# Patient Record
Sex: MALE | Race: WHITE | HISPANIC OR LATINO | Employment: FULL TIME | URBAN - METROPOLITAN AREA
[De-identification: names, ages, dates, MRNs, and addresses within clinical notes are randomized per-mention and may not be internally consistent; named-entity substitution may affect disease eponyms.]

---

## 2021-09-23 ENCOUNTER — OFFICE VISIT (OUTPATIENT)
Dept: MEDICAL GROUP | Facility: PHYSICIAN GROUP | Age: 36
End: 2021-09-23
Payer: COMMERCIAL

## 2021-09-23 VITALS
RESPIRATION RATE: 20 BRPM | HEART RATE: 87 BPM | DIASTOLIC BLOOD PRESSURE: 88 MMHG | SYSTOLIC BLOOD PRESSURE: 132 MMHG | TEMPERATURE: 99.3 F | OXYGEN SATURATION: 97 % | BODY MASS INDEX: 33.68 KG/M2 | WEIGHT: 209.6 LBS | HEIGHT: 66 IN

## 2021-09-23 DIAGNOSIS — Z23 NEED FOR VACCINATION: ICD-10-CM

## 2021-09-23 DIAGNOSIS — R53.83 OTHER FATIGUE: ICD-10-CM

## 2021-09-23 DIAGNOSIS — Z00.00 HEALTHCARE MAINTENANCE: ICD-10-CM

## 2021-09-23 DIAGNOSIS — Z76.89 ENCOUNTER TO ESTABLISH CARE: ICD-10-CM

## 2021-09-23 DIAGNOSIS — R63.5 WEIGHT GAIN: ICD-10-CM

## 2021-09-23 DIAGNOSIS — J30.2 SEASONAL ALLERGIES: ICD-10-CM

## 2021-09-23 PROCEDURE — 99204 OFFICE O/P NEW MOD 45 MIN: CPT | Mod: 25 | Performed by: STUDENT IN AN ORGANIZED HEALTH CARE EDUCATION/TRAINING PROGRAM

## 2021-09-23 PROCEDURE — 90471 IMMUNIZATION ADMIN: CPT | Performed by: STUDENT IN AN ORGANIZED HEALTH CARE EDUCATION/TRAINING PROGRAM

## 2021-09-23 PROCEDURE — 90686 IIV4 VACC NO PRSV 0.5 ML IM: CPT | Performed by: STUDENT IN AN ORGANIZED HEALTH CARE EDUCATION/TRAINING PROGRAM

## 2021-09-23 RX ORDER — FLUTICASONE PROPIONATE 50 MCG
1 SPRAY, SUSPENSION (ML) NASAL DAILY
Qty: 15.8 ML | Refills: 2 | Status: SHIPPED | OUTPATIENT
Start: 2021-09-23

## 2021-09-23 ASSESSMENT — PATIENT HEALTH QUESTIONNAIRE - PHQ9: CLINICAL INTERPRETATION OF PHQ2 SCORE: 0

## 2021-09-23 NOTE — PROGRESS NOTES
Subjective:     CC:  Diagnoses of Encounter to establish care, Seasonal allergies, Other fatigue, Weight gain, and Healthcare maintenance were pertinent to this visit.    HISTORY OF THE PRESENT ILLNESS: Patient is a 36 y.o. male. This pleasant patient is here today to establish care and discuss multiple concerns.    1.  Seasonal allergies  Caio has had seasonal allergies for most of his life.  This is his first opportunity to have this treated.  Manifest mostly as nasal congestion with occasional cough.  He does notice some symptoms throughout the year although certainly this is worse during spring and summer.  He has not attempted any medical treatments.    2.  Fatigue and weight gain  Over the past couple of years [place] has noted significant weight gain even while being careful with his diet.  He does admit that he does not exercise enough.  He also admits to significant fatigue that he feels might be disproportionate to what can be expected from aging and caring for children.  He is concerned that this may be an underlying health issue.    Active Diagnosis:    Patient Active Problem List   Diagnosis   • Encounter to establish care   • Seasonal allergies   • Other fatigue   • Weight gain        Current Outpatient Medications Ordered in Epic   Medication Sig Dispense Refill   • fluticasone (FLONASE) 50 MCG/ACT nasal spray Administer 1 Spray into affected nostril(S) every day. 15.8 mL 2     No current Epic-ordered facility-administered medications on file.     ROS:   Gen: No fevers/chills.  Weight gain that he feels is disproportionate to his diet.  HEENT: No changes in vision/hearing, sore throat.  Pulm: No cough, unexplained SOB.  CV: No chest pain/pressure, no palpitations  GI: No nausea/vomiting, no diarrhea  : No dysuria/nocturia  MSk: No myalgias  Skin: No rash/skin changes  Neuro: No headaches, no numbness/tingling  Heme/Lymph: no easy bruising      Objective:     Exam: /88 (BP Location: Left arm,  "Patient Position: Sitting, BP Cuff Size: Adult)   Pulse 87   Temp 37.4 °C (99.3 °F) (Temporal)   Resp 20   Ht 1.676 m (5' 6\")   Wt 95.1 kg (209 lb 9.6 oz)   SpO2 97%  Body mass index is 33.83 kg/m².    General: Normal appearing. No distress.  HEENT: Normocephalic. Eyes conjunctiva clear lids without ptosis, pupils equal and reactive to light accommodation. Ears normal shape and contour, canals are clear bilaterally, tympanic membranes are benign, nasal mucosa benign, oropharynx is without erythema, edema or exudates.   Neck: Supple without JVD or bruit. Thyroid is not enlarged.  Pulmonary: Clear to ausculation.  Normal effort. No rales, ronchi, or wheezing.  Cardiovascular: Regular rate and rhythm without murmur. Radial pulses are intact and equal bilaterally.  Abdomen: Soft, obese, nontender, nondistended. Normal bowel sounds.  No HSM.  Neurologic: Grossly nonfocal.  CN II through XII intact.  Lymph: No cervical or supraclavicular lymph nodes are palpable  Skin: Warm and dry.  No obvious lesions.  Musculoskeletal: Normal gait. No extremity cyanosis, clubbing, or edema.  Psych: Normal mood and affect. Alert and oriented x3. Judgment and insight are normal.    A chaperone was offered to the patient during today's exam. Patient declined chaperone.    Labs:   No labs available.    Assessment & Plan:   36 y.o. male with the following -    1.  Seasonal allergies  -Chronic, uncontrolled.  -Fluticasone 50 mcg 1 spray each nostril daily.    2.  Fatigue and weight gain  -This is likely due to diet/exercise/work schedule.  -After discussing diet it does appear that the patient does overeat a bit, particularly carbohydrates.  He has a limited sugary drinks from his diet.  He admits that his exercise is poor at best.  I have offered him some recommendations.  -TSH with free T4  -Testosterone serum    3.  Healthcare maintenance and need for vaccination.  -We have provided influenza vaccine in the clinic today.  -We have " discussed diet/exercise/healthy weight maintenance.  We have discussed the need for biannual dentistry.  We have discussed the need to always wear a seatbelt.  -CBC, CMP, hep C screen, lipid panel.        Return in about 1 year (around 9/23/2022).    Please note that this dictation was created using voice recognition software. I have made every reasonable attempt to correct obvious errors, but I expect that there are errors of grammar and possibly content that I did not discover before finalizing the note.    Jesus Carlton PA-C 9/23/2021

## 2021-09-28 ENCOUNTER — HOSPITAL ENCOUNTER (OUTPATIENT)
Dept: LAB | Facility: MEDICAL CENTER | Age: 36
End: 2021-09-28
Attending: STUDENT IN AN ORGANIZED HEALTH CARE EDUCATION/TRAINING PROGRAM
Payer: COMMERCIAL

## 2021-09-28 DIAGNOSIS — Z00.00 HEALTHCARE MAINTENANCE: ICD-10-CM

## 2021-09-28 DIAGNOSIS — R53.83 OTHER FATIGUE: ICD-10-CM

## 2021-09-28 DIAGNOSIS — R63.5 WEIGHT GAIN: ICD-10-CM

## 2021-09-28 LAB
ALBUMIN SERPL BCP-MCNC: 4.5 G/DL (ref 3.2–4.9)
ALBUMIN/GLOB SERPL: 1.3 G/DL
ALP SERPL-CCNC: 54 U/L (ref 30–99)
ALT SERPL-CCNC: 51 U/L (ref 2–50)
ANION GAP SERPL CALC-SCNC: 10 MMOL/L (ref 7–16)
AST SERPL-CCNC: 28 U/L (ref 12–45)
BILIRUB SERPL-MCNC: 0.5 MG/DL (ref 0.1–1.5)
BUN SERPL-MCNC: 19 MG/DL (ref 8–22)
CALCIUM SERPL-MCNC: 9.7 MG/DL (ref 8.5–10.5)
CHLORIDE SERPL-SCNC: 104 MMOL/L (ref 96–112)
CHOLEST SERPL-MCNC: 196 MG/DL (ref 100–199)
CO2 SERPL-SCNC: 25 MMOL/L (ref 20–33)
CREAT SERPL-MCNC: 0.79 MG/DL (ref 0.5–1.4)
ERYTHROCYTE [DISTWIDTH] IN BLOOD BY AUTOMATED COUNT: 43.7 FL (ref 35.9–50)
FASTING STATUS PATIENT QL REPORTED: NORMAL
GLOBULIN SER CALC-MCNC: 3.5 G/DL (ref 1.9–3.5)
GLUCOSE SERPL-MCNC: 107 MG/DL (ref 65–99)
HCT VFR BLD AUTO: 50 % (ref 42–52)
HCV AB SER QL: NORMAL
HDLC SERPL-MCNC: 45 MG/DL
HGB BLD-MCNC: 17.2 G/DL (ref 14–18)
LDLC SERPL CALC-MCNC: 129 MG/DL
MCH RBC QN AUTO: 32.1 PG (ref 27–33)
MCHC RBC AUTO-ENTMCNC: 34.4 G/DL (ref 33.7–35.3)
MCV RBC AUTO: 93.5 FL (ref 81.4–97.8)
PLATELET # BLD AUTO: 244 K/UL (ref 164–446)
PMV BLD AUTO: 9.8 FL (ref 9–12.9)
POTASSIUM SERPL-SCNC: 4.5 MMOL/L (ref 3.6–5.5)
PROT SERPL-MCNC: 8 G/DL (ref 6–8.2)
RBC # BLD AUTO: 5.35 M/UL (ref 4.7–6.1)
SODIUM SERPL-SCNC: 139 MMOL/L (ref 135–145)
TESTOST SERPL-MCNC: 435 NG/DL (ref 175–781)
TRIGL SERPL-MCNC: 109 MG/DL (ref 0–149)
TSH SERPL DL<=0.005 MIU/L-ACNC: 1.52 UIU/ML (ref 0.38–5.33)
WBC # BLD AUTO: 4.4 K/UL (ref 4.8–10.8)

## 2021-09-28 PROCEDURE — 80061 LIPID PANEL: CPT

## 2021-09-28 PROCEDURE — G0472 HEP C SCREEN HIGH RISK/OTHER: HCPCS

## 2021-09-28 PROCEDURE — 85027 COMPLETE CBC AUTOMATED: CPT

## 2021-09-28 PROCEDURE — 84403 ASSAY OF TOTAL TESTOSTERONE: CPT

## 2021-09-28 PROCEDURE — 80053 COMPREHEN METABOLIC PANEL: CPT

## 2021-09-28 PROCEDURE — 84443 ASSAY THYROID STIM HORMONE: CPT

## 2021-09-28 PROCEDURE — 36415 COLL VENOUS BLD VENIPUNCTURE: CPT

## 2021-11-12 ENCOUNTER — OFFICE VISIT (OUTPATIENT)
Dept: URGENT CARE | Facility: CLINIC | Age: 36
End: 2021-11-12
Payer: COMMERCIAL

## 2021-11-12 VITALS
OXYGEN SATURATION: 100 % | HEART RATE: 74 BPM | DIASTOLIC BLOOD PRESSURE: 84 MMHG | TEMPERATURE: 97.4 F | SYSTOLIC BLOOD PRESSURE: 110 MMHG | RESPIRATION RATE: 16 BRPM

## 2021-11-12 DIAGNOSIS — B97.89 ACUTE VIRAL SINUSITIS: ICD-10-CM

## 2021-11-12 DIAGNOSIS — J01.90 ACUTE VIRAL SINUSITIS: ICD-10-CM

## 2021-11-12 PROBLEM — J02.9 VIRAL PHARYNGITIS: Status: ACTIVE | Noted: 2021-11-12

## 2021-11-12 LAB
INT CON NEG: NORMAL
INT CON POS: NORMAL
S PYO AG THROAT QL: NEGATIVE

## 2021-11-12 PROCEDURE — 87880 STREP A ASSAY W/OPTIC: CPT | Performed by: STUDENT IN AN ORGANIZED HEALTH CARE EDUCATION/TRAINING PROGRAM

## 2021-11-12 PROCEDURE — 99213 OFFICE O/P EST LOW 20 MIN: CPT | Performed by: STUDENT IN AN ORGANIZED HEALTH CARE EDUCATION/TRAINING PROGRAM

## 2021-11-12 RX ORDER — MOMETASONE FUROATE 50 UG/1
2 SPRAY, METERED NASAL
Qty: 1 EACH | Refills: 1 | Status: SHIPPED | OUTPATIENT
Start: 2021-11-12

## 2021-11-12 NOTE — PROGRESS NOTES
Subjective:   CHIEF COMPLAINT  Chief Complaint   Patient presents with   • Sore Throat     x4days, sore throat, green phlegm       HPI  Caio Vázquez is a 36 y.o. male who presents c/o sinus congestion, sore throat green phlegm, hoarse voice x4 days. Also reports mild cough. He has tried mucinex and tylenol which have helped.  Symptoms are most notable for sitting in the morning.  Denies any wheezing or SOB.  Says his kids were sick with similar similar symptoms. He is vaccinated against against.     REVIEW OF SYSTEMS  General: no fever or chills  GI: no nausea or vomiting  See HPI for further details.     PAST MEDICAL HISTORY  Patient Active Problem List    Diagnosis Date Noted   • Viral pharyngitis 2021   • Encounter to establish care 2021   • Seasonal allergies 2021   • Other fatigue 2021   • Weight gain 2021       SURGICAL HISTORY   has a past surgical history that includes vasectomy and cholecystectomy.    ALLERGIES  No Known Allergies    CURRENT MEDICATIONS  Home Medications     Reviewed by Daren Nazario'simin (Medical Assistant) on 21 at 0829  Med List Status: <None>   Medication Last Dose Status   fluticasone (FLONASE) 50 MCG/ACT nasal spray Not Taking Active                SOCIAL HISTORY  Social History     Tobacco Use   • Smoking status: Former Smoker     Quit date:      Years since quittin.8   • Smokeless tobacco: Never Used   Vaping Use   • Vaping Use: Never used   Substance and Sexual Activity   • Alcohol use: Yes     Comment: 5 drinks a week    • Drug use: Yes     Types: Marijuana     Comment: gummies biweekly    • Sexual activity: Not on file       FAMILY HISTORY  Family History   Problem Relation Age of Onset   • Diabetes Mother    • No Known Problems Father    • No Known Problems Sister    • No Known Problems Brother    • No Known Problems Sister           Objective:   PHYSICAL EXAM  VITAL SIGNS: /84 (BP Location: Left arm,  Patient Position: Sitting, BP Cuff Size: Adult)   Pulse 74   Temp 36.3 °C (97.4 °F) (Temporal)   Resp 16   SpO2 100%     Gen: no acute distress, normal voice  Skin: dry, intact, moist mucosal membranes  ENT: Presence of postnasal drip.  Minimal pharyngeal erythema without exudates.  Uvula midline.  Lungs: CTAB w/ symmetric expansion  CV: RRR w/o murmurs or clicks  Psych: normal affect, normal judgement, alert, awake    Assessment/Plan:     1. Acute viral sinusitis  mometasone (NASONEX) 50 MCG/ACT nasal spray    POCT Rapid Strep A   Signs symptoms consistent with a viral sinusitis.  The patient's primary concern was the green phlegm that he produces for seeing the morning which is likely coming from the sinus.  Fortunately patient does not experience any significant sinus pain or pressure.  Discussed symptomatic treatment and continued monitoring.  He is immunized against Covid.    -Instructed to perform NeilMed sinus rinses  -Ordered Rx for Nasonex  -Hydration  -Follow-up as needed.    Differential diagnosis, natural history, supportive care, and indications for immediate follow-up discussed. Patient agrees with the plan of care.    Follow-up as needed if symptoms worsen or fail to improve to PCP, Urgent care or Emergency Room.       Please note that this dictation was created using voice recognition software. I have made a reasonable attempt to correct obvious errors, but I expect that there are errors of grammar and possibly content that I did not discover before finalizing the note.

## 2023-08-14 ENCOUNTER — DOCUMENTATION (OUTPATIENT)
Dept: HEALTH INFORMATION MANAGEMENT | Facility: OTHER | Age: 38
End: 2023-08-14
Payer: COMMERCIAL

## 2024-02-14 ENCOUNTER — TELEPHONE (OUTPATIENT)
Dept: HEALTH INFORMATION MANAGEMENT | Facility: OTHER | Age: 39
End: 2024-02-14
Payer: COMMERCIAL